# Patient Record
(demographics unavailable — no encounter records)

---

## 2024-12-20 NOTE — DISCUSSION/SUMMARY
[FreeTextEntry1] : 2y/o M with uri and wheeze. - Albuterol q4h for 48hrs then wean to q6h and as tolerated.  If unable to wean after 48hrs, must seek medical attention. - Recommend supportive care including antipyretics, fluids, OTC cough/cold medications if age-appropriate, and nasal saline followed by nasal suction. Return if symptoms worsen or persist.  Also with knee pain.  Possible growing pains +/- viral over the last few days.  Mom requesting Xray.  Recommend waiting about a week post uri.  Can consider labs at that time as well. - Return if worsens or persists  - Return precautions reviewed. Patient to seek medical attention in ED if has decreased oral intake, decrease in wet diapers/voids, fever >100.4F, difficulty breathing, becomes lethargic, or has a change in mental status or alertness. To note if fever > 5 days must be seen immediately either in clinic or in ED. - Return/ED precautions given.  RTC routine and prn.  Caretaker expressed understanding and all questions answered.

## 2024-12-20 NOTE — PHYSICAL EXAM
[Wheezing] : wheezing [NL] : warm, clear [de-identified] : no knee tenderness, swelling, ecchymosis

## 2024-12-20 NOTE — HISTORY OF PRESENT ILLNESS
[de-identified] : sick [FreeTextEntry6] : 2y/o M p/w cough, fever (101 max) x2d.  Took motrin at 12am.  Not taking any nebs.  Not doing any saline or suction.  Some decreased PO.  Also reports knee pain on and off for one month, b/l, gaps of 10-12d between, points to top of knee cap.  Has been hurting for the last 3d.  Mom interested in xrays and labs.  Denies autoimmune and rheum family history.  No known trauma.

## 2024-12-26 NOTE — DISCUSSION/SUMMARY
[FreeTextEntry1] : URI: Recommend supportive care including antipyretics, fluids, OTC cough/cold medications if age-appropriate, and nasal saline followed by nasal suction. Alarming breathing patterns such as retractions discussed and educational video reviewed, as applicable.   LEFT Otitis Media: Start antibiotics daily until complete. Continue supportive care.   Return precautions reviewed. Patient to be brought to the ED if has persistent decreased oral intake, decrease in wet diapers, fever >100.4F or becomes patient becomes lethargic or changed in mental status and alertness. To note if fever > 5 days must be seen immediately either in clinic or in ED.   Caretaker expressed understanding of the plan and agrees. No other concerns or questions today.

## 2024-12-26 NOTE — PHYSICAL EXAM
[Clear] : right tympanic membrane clear [Erythema] : erythema [Bulging] : bulging [Purulent Effusion] : purulent effusion [Clear Rhinorrhea] : clear rhinorrhea [NL] : warm, clear [FreeTextEntry4] : nasal congestion

## 2024-12-26 NOTE — HISTORY OF PRESENT ILLNESS
[FreeTextEntry6] : fever 102f today, new fever cough, congestion, runny nose for 1 week nbnb emesis Ibuprofen prn - 12.5ml q6h  No vomiting, diarrhea, sob or difficulty breathing, joint pain or swelling, rash, urinary symptoms, headaches, ear pain or tugging  pending knee xray, waiting until current illness resolves DIAR pointing at his knee LEFT when asked if with knee pain

## 2025-03-14 NOTE — HISTORY OF PRESENT ILLNESS
[FreeTextEntry6] : 2 days ago fever started 101f, tx with Ibuprofen 12.5ml prn  yesterday with several episodes of nbnb emesis and diarrhea with a little cough and sore throat as well decreased po intake however trying to hydrate well   no nasal congestion, sob or difficulty breathing, joint pain or swelling, rash, urinary symptoms, headaches, ear pain or tugging

## 2025-03-14 NOTE — DISCUSSION/SUMMARY
[FreeTextEntry1] : Viral syndrome: Recommend supportive care including antipyretics, fluids, OTC cough/cold medications if age-appropriate, and nasal saline followed by nasal suction. Patient to be brought to the ED if has persistent decreased oral intake, decrease in wet diapers, fever >100.4F or becomes patient becomes lethargic or changed in mental status and alertness. To note if fever > 5 days must be seen immediately either in clinic or in ED. In order to maintain hydration consume "oral rehydration solution," such as Pedialyte or low calorie sports drinks. If vomiting, try to give child a few teaspoons of fluid every few minutes. Avoid drinking juice or soda. These can make diarrhea worse. If tolerating solids, its best to consume lean meats, fruits, vegetables, and whole-grain breads and cereals. Avoid eating foods with a lot of fat or sugar, which can make symptoms worse.  Continue supportive care. ED precautions reviewed. RTC routine and prn. Caretaker expressed understanding of the plan and agrees. No other concerns or questions today.

## 2025-04-17 NOTE — DISCUSSION/SUMMARY
[Normal Growth] : growth [Normal Development] : development  [No Elimination Concerns] : elimination [Continue Regimen] : feeding [No Skin Concerns] : skin [Normal Sleep Pattern] : sleep [None] : no medical problems [School Readiness] : school readiness [Healthy Personal Habits] : healthy personal habits [TV/Media] : tv/media [Child and Family Involvement] : child and family involvement [Safety] : safety [Anticipatory Guidance Given] : Anticipatory guidance addressed as per the history of present illness section [No Medications] : ~He/She~ is not on any medications [Parent/Guardian] : Parent/Guardian [de-identified] : - Extensive discussion re: elevated BMI - advised to improve dietary habits and increase daily physical activity (at least 30min movement daily). BMI at 99 [FreeTextEntry6] : MMR, Varicella, DTaP/IPV [] : The components of the vaccine(s) to be administered today are listed in the plan of care. The disease(s) for which the vaccine(s) are intended to prevent and the risks have been discussed with the caretaker.  The risks are also included in the appropriate vaccination information statements which have been provided to the patient's caregiver.  The caregiver has given consent to vaccinate.

## 2025-04-17 NOTE — DISCUSSION/SUMMARY
[Normal Growth] : growth [Normal Development] : development  [No Elimination Concerns] : elimination [Continue Regimen] : feeding [No Skin Concerns] : skin [Normal Sleep Pattern] : sleep [None] : no medical problems [School Readiness] : school readiness [Healthy Personal Habits] : healthy personal habits [TV/Media] : tv/media [Child and Family Involvement] : child and family involvement [Safety] : safety [Anticipatory Guidance Given] : Anticipatory guidance addressed as per the history of present illness section [No Medications] : ~He/She~ is not on any medications [Parent/Guardian] : Parent/Guardian [de-identified] : - Extensive discussion re: elevated BMI - advised to improve dietary habits and increase daily physical activity (at least 30min movement daily). BMI at 99 [FreeTextEntry6] : MMR, Varicella, DTaP/IPV [] : The components of the vaccine(s) to be administered today are listed in the plan of care. The disease(s) for which the vaccine(s) are intended to prevent and the risks have been discussed with the caretaker.  The risks are also included in the appropriate vaccination information statements which have been provided to the patient's caregiver.  The caregiver has given consent to vaccinate.

## 2025-04-17 NOTE — PHYSICAL EXAM
[Alert] : alert [No Acute Distress] : no acute distress [Playful] : playful [Normocephalic] : normocephalic [Conjunctivae with no discharge] : conjunctivae with no discharge [PERRL] : PERRL [EOMI Bilateral] : EOMI bilateral [Auricles Well Formed] : auricles well formed [Clear Tympanic membranes with present light reflex and bony landmarks] : clear tympanic membranes with present light reflex and bony landmarks [No Discharge] : no discharge [Nares Patent] : nares patent [Pink Nasal Mucosa] : pink nasal mucosa [Palate Intact] : palate intact [Uvula Midline] : uvula midline [Nonerythematous Oropharynx] : nonerythematous oropharynx [No Caries] : no caries [Trachea Midline] : trachea midline [Supple, full passive range of motion] : supple, full passive range of motion [No Palpable Masses] : no palpable masses [Symmetric Chest Rise] : symmetric chest rise [Clear to Auscultation Bilaterally] : clear to auscultation bilaterally [Normoactive Precordium] : normoactive precordium [Regular Rate and Rhythm] : regular rate and rhythm [Normal S1, S2 present] : normal S1, S2 present [No Murmurs] : no murmurs [+2 Femoral Pulses] : +2 femoral pulses [Soft] : soft [NonTender] : non tender [Non Distended] : non distended [Normoactive Bowel Sounds] : normoactive bowel sounds [No Hepatomegaly] : no hepatomegaly [No Splenomegaly] : no splenomegaly [Ronak 1] : Ronak 1 [Circumcised] : circumcised [Central Urethral Opening] : central urethral opening [Testicles Descended Bilaterally] : testicles descended bilaterally [No Abnormal Lymph Nodes Palpated] : no abnormal lymph nodes palpated [Symmetric Buttocks Creases] : symmetric buttocks creases [Symmetric Hip Rotation] : symmetric hip rotation [No Gait Asymmetry] : no gait asymmetry [No pain or deformities with palpation of bone, muscles, joints] : no pain or deformities with palpation of bone, muscles, joints [Normal Muscle Tone] : normal muscle tone [No Spinal Dimple] : no spinal dimple [NoTuft of Hair] : no tuft of hair [Straight] : straight [+2 Patella DTR] : +2 patella DTR [Cranial Nerves Grossly Intact] : cranial nerves grossly intact [No Rash or Lesions] : no rash or lesions

## 2025-04-17 NOTE — HISTORY OF PRESENT ILLNESS
[Mother] : mother [Normal] : Normal [Brushing teeth] : Brushing teeth [Toothpaste] : Primary Fluoride Source: Toothpaste [No] : Not at  exposure [Carbon Monoxide Detectors] : Carbon monoxide detectors [Smoke Detectors] : Smoke detectors [Up to date] : Up to date [FreeTextEntry7] : Doing well - no major hospitalizations or ED visits  - knee pain episodes decreased [de-identified] : - well balanced, no new food allergies, sometimes picky eater [de-identified] : needs new dentist [FreeTextEntry9] : going to [employer] in the fall aspires to be in a book [NO] : No

## 2025-04-17 NOTE — HISTORY OF PRESENT ILLNESS
[Mother] : mother [Normal] : Normal [Brushing teeth] : Brushing teeth [Toothpaste] : Primary Fluoride Source: Toothpaste [No] : Not at  exposure [Carbon Monoxide Detectors] : Carbon monoxide detectors [Smoke Detectors] : Smoke detectors [Up to date] : Up to date [FreeTextEntry7] : Doing well - no major hospitalizations or ED visits  - knee pain episodes decreased [de-identified] : - well balanced, no new food allergies, sometimes picky eater [de-identified] : needs new dentist [FreeTextEntry9] : going to [employer] in the fall aspires to be in a book [NO] : No